# Patient Record
Sex: FEMALE | URBAN - METROPOLITAN AREA
[De-identification: names, ages, dates, MRNs, and addresses within clinical notes are randomized per-mention and may not be internally consistent; named-entity substitution may affect disease eponyms.]

---

## 2022-03-07 ENCOUNTER — APPOINTMENT (RX ONLY)
Dept: URBAN - METROPOLITAN AREA CLINIC 328 | Facility: CLINIC | Age: 57
Setting detail: DERMATOLOGY
End: 2022-03-07

## 2022-03-07 DIAGNOSIS — L82.1 OTHER SEBORRHEIC KERATOSIS: ICD-10-CM

## 2022-03-07 DIAGNOSIS — L40.0 PSORIASIS VULGARIS: ICD-10-CM

## 2022-03-07 DIAGNOSIS — L81.4 OTHER MELANIN HYPERPIGMENTATION: ICD-10-CM

## 2022-03-07 DIAGNOSIS — D22 MELANOCYTIC NEVI: ICD-10-CM

## 2022-03-07 DIAGNOSIS — D18.0 HEMANGIOMA: ICD-10-CM

## 2022-03-07 DIAGNOSIS — D18.0 HEMANGIOMA: ICD-10-CM | Status: INADEQUATELY CONTROLLED

## 2022-03-07 PROBLEM — D18.01 HEMANGIOMA OF SKIN AND SUBCUTANEOUS TISSUE: Status: ACTIVE | Noted: 2022-03-07

## 2022-03-07 PROBLEM — D22.5 MELANOCYTIC NEVI OF TRUNK: Status: ACTIVE | Noted: 2022-03-07

## 2022-03-07 PROCEDURE — ? PRESCRIPTION MEDICATION MANAGEMENT

## 2022-03-07 PROCEDURE — 99203 OFFICE O/P NEW LOW 30 MIN: CPT

## 2022-03-07 PROCEDURE — ? FULL BODY SKIN EXAM

## 2022-03-07 PROCEDURE — ? COUNSELING

## 2022-03-07 PROCEDURE — ? IN-HOUSE DISPENSING PHARMACY

## 2022-03-07 PROCEDURE — ? ADDITIONAL NOTES

## 2022-03-07 PROCEDURE — ? TREATMENT REGIMEN

## 2022-03-07 PROCEDURE — ? PRESCRIPTION

## 2022-03-07 RX ORDER — CALCIPOTRIENE 0.05 MG/ML
SOLUTION TOPICAL
Qty: 60 | Refills: 3 | Status: ERX | COMMUNITY
Start: 2022-03-07

## 2022-03-07 RX ORDER — FLUOCINONIDE 0.5 MG/ML
SOLUTION TOPICAL
Qty: 60 | Refills: 3 | Status: ERX | COMMUNITY
Start: 2022-03-07

## 2022-03-07 RX ADMIN — CALCIPOTRIENE: 0.05 SOLUTION TOPICAL at 00:00

## 2022-03-07 RX ADMIN — FLUOCINONIDE: 0.5 SOLUTION TOPICAL at 00:00

## 2022-03-07 ASSESSMENT — LOCATION ZONE DERM
LOCATION ZONE: ARM
LOCATION ZONE: SCALP
LOCATION ZONE: LEG
LOCATION ZONE: TRUNK
LOCATION ZONE: LEG

## 2022-03-07 ASSESSMENT — LOCATION DETAILED DESCRIPTION DERM
LOCATION DETAILED: RIGHT MEDIAL FRONTAL SCALP
LOCATION DETAILED: EPIGASTRIC SKIN
LOCATION DETAILED: LEFT ANTERIOR PROXIMAL THIGH
LOCATION DETAILED: LEFT ANTERIOR SHOULDER
LOCATION DETAILED: LEFT MID-UPPER BACK
LOCATION DETAILED: RIGHT SUPERIOR MEDIAL MIDBACK
LOCATION DETAILED: RIGHT MEDIAL UPPER BACK
LOCATION DETAILED: LEFT ANTERIOR PROXIMAL THIGH

## 2022-03-07 ASSESSMENT — LOCATION SIMPLE DESCRIPTION DERM
LOCATION SIMPLE: RIGHT SCALP
LOCATION SIMPLE: LEFT SHOULDER
LOCATION SIMPLE: LEFT THIGH
LOCATION SIMPLE: LEFT UPPER BACK
LOCATION SIMPLE: RIGHT LOWER BACK
LOCATION SIMPLE: RIGHT UPPER BACK
LOCATION SIMPLE: ABDOMEN
LOCATION SIMPLE: LEFT THIGH

## 2022-03-07 NOTE — PROCEDURE: PRESCRIPTION MEDICATION MANAGEMENT
Initiate Treatment: Per DB to start LB on scalp use fluocinonide solution and dovonex solution
Render In Strict Bullet Format?: No
Detail Level: Zone

## 2022-03-07 NOTE — PROCEDURE: ADDITIONAL NOTES
Detail Level: Simple
Render Risk Assessment In Note?: no
Additional Notes: Patient consent was obtained to proceed with the visit and recommended plan of care after discussion of all risks and benefits, including the risks of COVID-19 exposure
Additional Notes: Patient consent was obtained to proceed with the visit and recommended plan of care after discussion of all risks and benefits, including the risks of COVID-19 exposure.

## 2022-03-07 NOTE — PROCEDURE: IN-HOUSE DISPENSING PHARMACY
Product 59 Amount/Unit (Numbers Only): 0
Product 51 Unit Type: mg
Product 1 Unit Type: ml
Product 5 Application Directions: apply to affected area
Name Of Product 5: Rosacea Cream #13
Product 2 Refills: 3
Render Refills If Set To 0: Yes
Name Of Product 4: Acne # 4
Name Of Product 1: Dermatitis Topical  Solution #16
Product 3 Refills: 4
Product 4 Application Directions: apply to affected area at night
Detail Level: Zone
Product 1 Application Directions: Mix into a jar of Cerave Cream and apply to affected areas daily.

## 2022-04-14 ENCOUNTER — OFFICE VISIT (OUTPATIENT)
Dept: URBAN - METROPOLITAN AREA CLINIC 60 | Facility: CLINIC | Age: 57
End: 2022-04-14

## 2022-05-02 ENCOUNTER — LAB OUTSIDE AN ENCOUNTER (OUTPATIENT)
Dept: URBAN - METROPOLITAN AREA CLINIC 121 | Facility: CLINIC | Age: 57
End: 2022-05-02

## 2022-05-02 ENCOUNTER — APPOINTMENT (RX ONLY)
Dept: URBAN - METROPOLITAN AREA CLINIC 328 | Facility: CLINIC | Age: 57
Setting detail: DERMATOLOGY
End: 2022-05-02

## 2022-05-02 DIAGNOSIS — L40.0 PSORIASIS VULGARIS: ICD-10-CM | Status: WELL CONTROLLED

## 2022-05-02 PROCEDURE — ? COUNSELING

## 2022-05-02 PROCEDURE — ? PRESCRIPTION MEDICATION MANAGEMENT

## 2022-05-02 PROCEDURE — 99213 OFFICE O/P EST LOW 20 MIN: CPT

## 2022-05-02 ASSESSMENT — LOCATION DETAILED DESCRIPTION DERM: LOCATION DETAILED: RIGHT MEDIAL FRONTAL SCALP

## 2022-05-02 ASSESSMENT — LOCATION SIMPLE DESCRIPTION DERM: LOCATION SIMPLE: RIGHT SCALP

## 2022-05-02 ASSESSMENT — LOCATION ZONE DERM: LOCATION ZONE: SCALP

## 2022-05-02 NOTE — PROCEDURE: PRESCRIPTION MEDICATION MANAGEMENT
Render In Strict Bullet Format?: No
Continue Regimen: Calcipotriene 0.005% every day to every other day. Fluocinonide when flared.
Detail Level: Zone

## 2022-05-06 LAB
A/G RATIO: 1.8
ACTIN (SMOOTH MUSCLE) ANTIBODY: (no result)
AFP, SERUM, TUMOR MARKER: (no result)
ALBUMIN: (no result)
ALKALINE PHOSPHATASE: (no result)
ALPHA-1-ANTITRYPSIN, SERUM: (no result)
ALT (SGPT): (no result)
ANA DIRECT: NEGATIVE
AST (SGOT): (no result)
BASO (ABSOLUTE): (no result)
BASOS: (no result)
BILIRUBIN, TOTAL: (no result)
BUN/CREATININE RATIO: 13
BUN: (no result)
CALCIUM: (no result)
CARBON DIOXIDE, TOTAL: (no result)
CERULOPLASMIN: (no result)
CHLORIDE: (no result)
COPPER, SERUM OR PLASMA: (no result)
CREATININE: (no result)
EGFR: (no result)
EOS (ABSOLUTE): (no result)
EOS: (no result)
FERRITIN: (no result)
GLOBULIN, TOTAL: (no result)
GLUCOSE: (no result)
HEMATOCRIT: (no result)
HEMATOLOGY COMMENTS:: (no result)
HEMOGLOBIN: (no result)
HEP A AB, TOTAL: NEGATIVE
HEP B CORE AB, IGM: NEGATIVE
HEP B CORE AB, TOT: NEGATIVE
HEP B SURFACE AB, QUAL: NON REACTIVE
HEREDITARY  HEMOCHROMATOSIS: (no result)
IMMATURE CELLS: (no result)
IMMATURE GRANS (ABS): (no result)
IMMATURE GRANULOCYTES: (no result)
INR: 1
IRON BIND.CAP.(TIBC): (no result)
IRON SATURATION: (no result)
IRON: (no result)
LYMPHS (ABSOLUTE): (no result)
LYMPHS: (no result)
MCH: (no result)
MCHC: (no result)
MCV: (no result)
MITOCHONDRIAL (M2) ANTIBODY: (no result)
MONOCYTES(ABSOLUTE): (no result)
MONOCYTES: (no result)
NEUTROPHILS (ABSOLUTE): (no result)
NEUTROPHILS: (no result)
NRBC: (no result)
PLATELETS: (no result)
POTASSIUM: (no result)
PROTEIN, TOTAL: (no result)
PROTHROMBIN TIME: (no result)
RBC: (no result)
RDW: (no result)
SODIUM: (no result)
UIBC: (no result)
WBC: (no result)
WRITTEN AUTHORIZATION: (no result)

## 2022-05-16 ENCOUNTER — OFFICE VISIT (OUTPATIENT)
Dept: URBAN - METROPOLITAN AREA CLINIC 60 | Facility: CLINIC | Age: 57
End: 2022-05-16

## 2022-07-09 ENCOUNTER — TELEPHONE ENCOUNTER (OUTPATIENT)
Dept: URBAN - METROPOLITAN AREA CLINIC 121 | Facility: CLINIC | Age: 57
End: 2022-07-09

## 2022-07-09 RX ORDER — LOSARTAN POTASSIUM 100 MG/1
TABLET, FILM COATED ORAL
Refills: 0 | OUTPATIENT
Start: 2022-04-19 | End: 2022-05-16

## 2022-07-09 RX ORDER — METOPROLOL SUCCINATE 25 MG/1
TABLET, EXTENDED RELEASE ORAL
Refills: 0 | OUTPATIENT
Start: 2022-04-17 | End: 2022-05-16

## 2022-07-09 RX ORDER — BENZONATATE 100 MG/1
CAPSULE ORAL
Refills: 0 | OUTPATIENT
Start: 2022-03-21 | End: 2022-04-14

## 2022-07-09 RX ORDER — CALCIPOTRIENE 0.05 MG/ML
SOLUTION TOPICAL
Refills: 0 | OUTPATIENT
Start: 2022-03-08 | End: 2022-04-14

## 2022-07-09 RX ORDER — LOSARTAN POTASSIUM 100 MG/1
TABLET, FILM COATED ORAL
Refills: 0 | OUTPATIENT
Start: 2022-03-22 | End: 2022-04-14

## 2022-07-09 RX ORDER — METOPROLOL SUCCINATE 25 MG/1
TABLET, EXTENDED RELEASE ORAL
Refills: 0 | OUTPATIENT
Start: 2022-03-16 | End: 2022-04-14

## 2022-07-10 ENCOUNTER — TELEPHONE ENCOUNTER (OUTPATIENT)
Dept: URBAN - METROPOLITAN AREA CLINIC 121 | Facility: CLINIC | Age: 57
End: 2022-07-10

## 2022-07-10 RX ORDER — METOPROLOL SUCCINATE 25 MG/1
TABLET, EXTENDED RELEASE ORAL
Refills: 0 | Status: ACTIVE | COMMUNITY
Start: 2022-04-14

## 2022-07-10 RX ORDER — LOSARTAN POTASSIUM 100 MG/1
TABLET, FILM COATED ORAL
Refills: 0 | Status: ACTIVE | COMMUNITY
Start: 2022-05-16

## 2022-07-10 RX ORDER — METOPROLOL SUCCINATE 25 MG/1
TABLET, EXTENDED RELEASE ORAL
Refills: 0 | Status: ACTIVE | COMMUNITY
Start: 2022-05-16

## 2022-07-10 RX ORDER — LOSARTAN POTASSIUM 100 MG/1
TABLET, FILM COATED ORAL
Refills: 0 | Status: ACTIVE | COMMUNITY
Start: 2022-04-14

## 2022-07-30 ENCOUNTER — TELEPHONE ENCOUNTER (OUTPATIENT)
Age: 57
End: 2022-07-30

## 2022-07-31 ENCOUNTER — TELEPHONE ENCOUNTER (OUTPATIENT)
Age: 57
End: 2022-07-31

## 2022-08-01 ENCOUNTER — LAB OUTSIDE AN ENCOUNTER (OUTPATIENT)
Dept: URBAN - METROPOLITAN AREA CLINIC 63 | Facility: CLINIC | Age: 57
End: 2022-08-01

## 2022-08-02 LAB
A/G RATIO: 1.7
ALBUMIN: 4.5
ALKALINE PHOSPHATASE: 114
ALT (SGPT): 43
AST (SGOT): 28
BASO (ABSOLUTE): 0
BASOS: 0
BILIRUBIN, TOTAL: 0.3
BUN/CREATININE RATIO: 20
BUN: 14
CALCIUM: 9.7
CARBON DIOXIDE, TOTAL: 24
CHLORIDE: 103
CREATININE: 0.69
EGFR: 101
EOS (ABSOLUTE): 0.1
EOS: 2
FERRITIN, SERUM: 232
GLOBULIN, TOTAL: 2.6
GLUCOSE: 92
HEMATOCRIT: 41.6
HEMATOLOGY COMMENTS:: (no result)
HEMOGLOBIN: 14.3
IMMATURE CELLS: (no result)
IMMATURE GRANS (ABS): 0
IMMATURE GRANULOCYTES: 0
LYMPHS (ABSOLUTE): 1.7
LYMPHS: 25
MCH: 33.7
MCHC: 34.4
MCV: 98
MONOCYTES(ABSOLUTE): 0.6
MONOCYTES: 8
NEUTROPHILS (ABSOLUTE): 4.5
NEUTROPHILS: 65
NRBC: (no result)
PLATELETS: 257
POTASSIUM: 4.5
PROTEIN, TOTAL: 7.1
RBC: 4.24
RDW: 13.1
SODIUM: 140
WBC: 6.9

## 2022-08-15 ENCOUNTER — WEB ENCOUNTER (OUTPATIENT)
Dept: URBAN - METROPOLITAN AREA CLINIC 60 | Facility: CLINIC | Age: 57
End: 2022-08-15

## 2022-08-15 ENCOUNTER — OFFICE VISIT (OUTPATIENT)
Dept: URBAN - METROPOLITAN AREA CLINIC 60 | Facility: CLINIC | Age: 57
End: 2022-08-15
Payer: COMMERCIAL

## 2022-08-15 VITALS
HEART RATE: 75 BPM | RESPIRATION RATE: 12 BRPM | DIASTOLIC BLOOD PRESSURE: 82 MMHG | OXYGEN SATURATION: 96 % | BODY MASS INDEX: 29.32 KG/M2 | TEMPERATURE: 97 F | HEIGHT: 67 IN | WEIGHT: 186.8 LBS | SYSTOLIC BLOOD PRESSURE: 136 MMHG

## 2022-08-15 DIAGNOSIS — R79.89 ABNORMAL LFTS: ICD-10-CM

## 2022-08-15 PROCEDURE — 99213 OFFICE O/P EST LOW 20 MIN: CPT | Performed by: NURSE PRACTITIONER

## 2022-08-15 RX ORDER — LOSARTAN POTASSIUM 100 MG/1
TABLET, FILM COATED ORAL
Refills: 0 | Status: ACTIVE | COMMUNITY
Start: 2022-05-16

## 2022-08-15 RX ORDER — METOPROLOL SUCCINATE 25 MG/1
TABLET, EXTENDED RELEASE ORAL
Refills: 0 | Status: ACTIVE | COMMUNITY
Start: 2022-05-16

## 2022-08-15 NOTE — HPI-PREVIOUS IMAGING
Ultrasound abdomen Impression: 1.  Coarsened hepatic echotexture indicative of diffuse hepatocellular disease such as fatty infiltration. 2.  Nonvisualization of the pancreas, obscured by bowel gas.  FibroScan 08/01/2022 Impression: Mild steatosis without evidence of fibrosis, S1, F0  FibroScan 02/08/2022 Impression: 1.  Severe steatosis S3 2.  No fibrosis F0  Ultrasound liver 01/25/2022 Impression: Increased hepatic echogenicity suggestive of hepatocellular disease or steatosis.

## 2022-08-15 NOTE — HPI-PREVIOUS LABS
Laboratory results 08/01/2022 as reported later in note.  Laboratory results 08/01/2022 CBC MCV 98 CMP ALT 43 Ferritin 232  Laboratory results 05/02/2022 CBC unremarkable CMP Glucose 102 ALT 39 Ferritin 259 Alpha 1 antitrypsin 96 AFP within normal limits Smooth muscle antibody negative SHANNON direct negative Ceruloplasmin within normal limits Copper, serum within normal limits Hepatitis A and hepatitis B negative Hereditary hemochromatosis mutation not detected Iron and TIBC within normal limits Mitochondrial antibody negative PT/INR 10.2/1.0  Laboratory results 03/25/2022 Hepatic function panel Alkaline phosphatase 129 AST 65   Laboratory results 03/21/2022 CBC HCT 48.5 CMP Glucose 115 Protein, total 8.5 Alk phos 131   Influenza A positive  Laboratory results 01/18/2022 HCV antibody negative

## 2022-11-14 ENCOUNTER — OFFICE VISIT (OUTPATIENT)
Dept: URBAN - METROPOLITAN AREA CLINIC 60 | Facility: CLINIC | Age: 57
End: 2022-11-14

## 2022-12-01 ENCOUNTER — LAB OUTSIDE AN ENCOUNTER (OUTPATIENT)
Dept: URBAN - METROPOLITAN AREA CLINIC 60 | Facility: CLINIC | Age: 57
End: 2022-12-01

## 2022-12-09 LAB
A/G RATIO: 1.8
ALBUMIN: 4.6
ALKALINE PHOSPHATASE: 121
ALT (SGPT): 31
AST (SGOT): 33
BASO (ABSOLUTE): 0
BASOS: 0
BILIRUBIN, TOTAL: 0.3
BUN/CREATININE RATIO: 20
BUN: 12
CALCIUM: 9.6
CARBON DIOXIDE, TOTAL: 24
CHLORIDE: 104
CREATININE: 0.6
EGFR: 104
EOS (ABSOLUTE): 0.2
EOS: 2
FERRITIN, SERUM: 288
GLOBULIN, TOTAL: 2.6
GLUCOSE: 106
HEMATOCRIT: 39.2
HEMATOLOGY COMMENTS:: (no result)
HEMOGLOBIN: 12.8
IMMATURE CELLS: (no result)
IMMATURE GRANS (ABS): 0
IMMATURE GRANULOCYTES: 0
IRON BIND.CAP.(TIBC): 366
IRON SATURATION: 18
IRON: 65
LYMPHS (ABSOLUTE): 1.3
LYMPHS: 17
MCH: 31.8
MCHC: 32.7
MCV: 97
MONOCYTES(ABSOLUTE): 0.5
MONOCYTES: 6
NEUTROPHILS (ABSOLUTE): 5.7
NEUTROPHILS: 75
NRBC: (no result)
PLATELETS: 258
POTASSIUM: 4.7
PROTEIN, TOTAL: 7.2
RBC: 4.03
RDW: 12.9
SODIUM: 140
UIBC: 301
WBC: 7.6

## 2022-12-12 ENCOUNTER — OFFICE VISIT (OUTPATIENT)
Dept: URBAN - METROPOLITAN AREA CLINIC 60 | Facility: CLINIC | Age: 57
End: 2022-12-12
Payer: COMMERCIAL

## 2022-12-12 VITALS
RESPIRATION RATE: 20 BRPM | DIASTOLIC BLOOD PRESSURE: 80 MMHG | SYSTOLIC BLOOD PRESSURE: 120 MMHG | TEMPERATURE: 98.2 F | WEIGHT: 198 LBS | HEIGHT: 67 IN | BODY MASS INDEX: 31.08 KG/M2 | HEART RATE: 88 BPM | OXYGEN SATURATION: 98 %

## 2022-12-12 DIAGNOSIS — R79.89 ABNORMAL LFTS: ICD-10-CM

## 2022-12-12 DIAGNOSIS — K76.0 FATTY LIVER: ICD-10-CM

## 2022-12-12 PROBLEM — 197321007: Status: ACTIVE | Noted: 2022-12-12

## 2022-12-12 PROCEDURE — 99213 OFFICE O/P EST LOW 20 MIN: CPT | Performed by: NURSE PRACTITIONER

## 2022-12-12 RX ORDER — METOPROLOL SUCCINATE 25 MG/1
TABLET, EXTENDED RELEASE ORAL
Refills: 0 | Status: ACTIVE | COMMUNITY
Start: 2022-05-16

## 2022-12-12 RX ORDER — LOSARTAN POTASSIUM 100 MG/1
TABLET, FILM COATED ORAL
Refills: 0 | Status: ACTIVE | COMMUNITY
Start: 2022-05-16

## 2022-12-12 NOTE — HPI-PREVIOUS IMAGING
Ultrasound abdomen 08/01/2022 Impression: 1.  Coarsened hepatic echotexture indicative of diffuse hepatocellular disease such as fatty infiltration. 2.  Nonvisualization of the pancreas, obscured by bowel gas.  FibroScan 08/01/2022 Impression: Mild steatosis without evidence of fibrosis, S1, F0  FibroScan 02/08/2022 Impression: 1.  Severe steatosis S3 2.  No fibrosis F0  Ultrasound liver 01/25/2022 Impression: Increased hepatic echogenicity suggestive of hepatocellular disease or steatosis.

## 2022-12-12 NOTE — HPI-PREVIOUS LABS
Laboratory results 12/08/2022 CBC unremarkable CMP Glucose 106 Ferritin 288 Iron indices within normal limits  Laboratory results 08/01/2022 CBC MCV 98 CMP ALT 43 Ferritin 232  Laboratory results 05/02/2022 CBC unremarkable CMP Glucose 102 ALT 39 Ferritin 259 Alpha 1 antitrypsin 96 AFP within normal limits Smooth muscle antibody negative SHANNON direct negative Ceruloplasmin within normal limits Copper, serum within normal limits Hepatitis A and hepatitis B negative Hereditary hemochromatosis mutation not detected Iron and TIBC within normal limits Mitochondrial antibody negative PT/INR 10.2/1.0  Laboratory results 03/25/2022 Hepatic function panel Alkaline phosphatase 129 AST 65   Laboratory results 03/21/2022 CBC HCT 48.5 CMP Glucose 115 Protein, total 8.5 Alk phos 131   Influenza A positive  Laboratory results 01/18/2022 HCV antibody negative

## 2022-12-12 NOTE — HPI-TODAY'S VISIT:
Ms. Oconnor is a pleasant 57-year-old female evaluated in follow-up of LFTs.  She was previously evaluated 08/15/2022 in follow-up of elevated liver enzymes, and fatty liver. She was asymptomatic and without signs or symptoms of decompensated liver disease.  Drinks 2 to 4 glasses of wine about 2-3 times weekly.  Denied use of supplements, taking only losartan and metoprolol.  Having daily bowel movements. Previous work-up negative for viral and autoimmune etiology. She was advised to discontinue alcohol, repeat labs in 3 months, follow-up afterwards.  Repeat labs and imaging in 6 months, follow-up afterwards.  History of fatty liver.  LFTs were normalizing, ferritin slightly elevated, negative for hereditary hemochromatosis mutation.  .  Today, she is asymptomatic and voices no complaint.   Avoided alcohol at time of recent labs.

## 2023-03-01 ENCOUNTER — LAB OUTSIDE AN ENCOUNTER (OUTPATIENT)
Dept: URBAN - METROPOLITAN AREA CLINIC 60 | Facility: CLINIC | Age: 58
End: 2023-03-01

## 2023-03-23 ENCOUNTER — OFFICE VISIT (OUTPATIENT)
Dept: URBAN - METROPOLITAN AREA CLINIC 60 | Facility: CLINIC | Age: 58
End: 2023-03-23

## 2023-03-30 ENCOUNTER — TELEPHONE ENCOUNTER (OUTPATIENT)
Dept: URBAN - METROPOLITAN AREA CLINIC 64 | Facility: CLINIC | Age: 58
End: 2023-03-30

## 2023-04-13 ENCOUNTER — OFFICE VISIT (OUTPATIENT)
Dept: URBAN - METROPOLITAN AREA CLINIC 60 | Facility: CLINIC | Age: 58
End: 2023-04-13

## 2023-06-01 ENCOUNTER — OFFICE VISIT (OUTPATIENT)
Dept: URBAN - METROPOLITAN AREA CLINIC 60 | Facility: CLINIC | Age: 58
End: 2023-06-01

## 2023-06-15 ENCOUNTER — LAB OUTSIDE AN ENCOUNTER (OUTPATIENT)
Dept: URBAN - METROPOLITAN AREA CLINIC 63 | Facility: CLINIC | Age: 58
End: 2023-06-15

## 2023-06-16 LAB
A/G RATIO: 1.8
ALBUMIN: 4.8
ALKALINE PHOSPHATASE: 121
ALT (SGPT): 23
AST (SGOT): 25
BASO (ABSOLUTE): 0
BASOS: 0
BILIRUBIN, TOTAL: 0.3
BUN/CREATININE RATIO: 21
BUN: 14
CALCIUM: 9.8
CARBON DIOXIDE, TOTAL: 23
CHLORIDE: 102
CREATININE: 0.68
EGFR: 101
EOS (ABSOLUTE): 0.2
EOS: 3
FERRITIN, SERUM: 185
GLOBULIN, TOTAL: 2.6
GLUCOSE: 100
HEMATOCRIT: 40
HEMATOLOGY COMMENTS:: (no result)
HEMOGLOBIN: 13.2
IMMATURE CELLS: (no result)
IMMATURE GRANS (ABS): 0
IMMATURE GRANULOCYTES: 0
IRON BIND.CAP.(TIBC): 338
IRON SATURATION: 21
IRON: 72
LYMPHS (ABSOLUTE): 1.8
LYMPHS: 22
MCH: 30.8
MCHC: 33
MCV: 93
MONOCYTES(ABSOLUTE): 0.6
MONOCYTES: 7
NEUTROPHILS (ABSOLUTE): 5.6
NEUTROPHILS: 68
NRBC: (no result)
PLATELETS: 261
POTASSIUM: 5
PROTEIN, TOTAL: 7.4
RBC: 4.29
RDW: 14
SODIUM: 142
UIBC: 266
WBC: 8.2

## 2023-10-04 ENCOUNTER — TELEPHONE ENCOUNTER (OUTPATIENT)
Dept: URBAN - METROPOLITAN AREA CLINIC 9 | Facility: CLINIC | Age: 58
End: 2023-10-04

## 2023-10-04 VITALS — HEIGHT: 67 IN | BODY MASS INDEX: 32.96 KG/M2 | WEIGHT: 210 LBS

## 2023-10-04 RX ORDER — METOPROLOL SUCCINATE 25 MG/1
TABLET, EXTENDED RELEASE ORAL
Refills: 0 | COMMUNITY
Start: 2022-05-16

## 2023-10-04 RX ORDER — LOSARTAN POTASSIUM 100 MG/1
TABLET, FILM COATED ORAL
Refills: 0 | COMMUNITY
Start: 2022-05-16

## 2023-11-15 ENCOUNTER — DASHBOARD ENCOUNTERS (OUTPATIENT)
Age: 58
End: 2023-11-15

## 2023-11-15 ENCOUNTER — OFFICE VISIT (OUTPATIENT)
Dept: URBAN - METROPOLITAN AREA CLINIC 9 | Facility: CLINIC | Age: 58
End: 2023-11-15
Payer: COMMERCIAL

## 2023-11-15 ENCOUNTER — OFFICE VISIT (OUTPATIENT)
Dept: URBAN - METROPOLITAN AREA CLINIC 9 | Facility: CLINIC | Age: 58
End: 2023-11-15

## 2023-11-15 VITALS
DIASTOLIC BLOOD PRESSURE: 72 MMHG | WEIGHT: 212 LBS | SYSTOLIC BLOOD PRESSURE: 128 MMHG | HEIGHT: 67 IN | BODY MASS INDEX: 33.27 KG/M2

## 2023-11-15 DIAGNOSIS — Z86.010 HISTORY OF COLON POLYPS: ICD-10-CM

## 2023-11-15 PROBLEM — 428283002: Status: ACTIVE | Noted: 2023-11-15

## 2023-11-15 PROCEDURE — 99202 OFFICE O/P NEW SF 15 MIN: CPT | Performed by: INTERNAL MEDICINE

## 2023-11-15 RX ORDER — LOSARTAN POTASSIUM 100 MG/1
TABLET, FILM COATED ORAL
Refills: 0 | COMMUNITY
Start: 2022-05-16

## 2023-11-15 RX ORDER — LOSARTAN POTASSIUM 100 MG/1
TABLET, FILM COATED ORAL
Refills: 0 | Status: ACTIVE | COMMUNITY
Start: 2022-05-16

## 2023-11-15 RX ORDER — METOPROLOL SUCCINATE 25 MG/1
TABLET, EXTENDED RELEASE ORAL
Refills: 0 | COMMUNITY
Start: 2022-05-16

## 2023-11-15 RX ORDER — METOPROLOL SUCCINATE 25 MG/1
TABLET, EXTENDED RELEASE ORAL
Refills: 0 | Status: ACTIVE | COMMUNITY
Start: 2022-05-16

## 2023-11-15 NOTE — HPI-TODAY'S VISIT:
pt here for eval for a colonoscopy. . Pt has a hx/o colon polyps . Hx/o dilated cardiomyopathy with complete resolution and now normal EF and cardiac function . No bowel changes, bleeding, dysphagia, gerd.  . She needs her f/u colon. Will arrange. RTC prn.  .

## 2023-11-30 ENCOUNTER — CLAIMS CREATED FROM THE CLAIM WINDOW (OUTPATIENT)
Dept: URBAN - METROPOLITAN AREA SURGERY CENTER 9 | Facility: SURGERY CENTER | Age: 58
End: 2023-11-30
Payer: COMMERCIAL

## 2023-11-30 DIAGNOSIS — K64.1 SECOND DEGREE HEMORRHOIDS: ICD-10-CM

## 2023-11-30 DIAGNOSIS — K57.30 DIVERTICULOSIS OF LARGE INTESTINE WITHOUT PERFORATION OR ABSCESS WITHOUT BLEEDING: ICD-10-CM

## 2023-11-30 DIAGNOSIS — Z86.010 HISTORY OF ADENOMATOUS POLYP OF COLON: ICD-10-CM

## 2023-11-30 DIAGNOSIS — Z86.010 PERSONAL HISTORY OF COLONIC POLYPS: ICD-10-CM

## 2023-11-30 PROCEDURE — 45378 DIAGNOSTIC COLONOSCOPY: CPT | Performed by: INTERNAL MEDICINE

## 2023-11-30 PROCEDURE — 00812 ANES LWR INTST SCR COLSC: CPT | Performed by: NURSE ANESTHETIST, CERTIFIED REGISTERED

## 2023-11-30 RX ORDER — LOSARTAN POTASSIUM 100 MG/1
TABLET, FILM COATED ORAL
Refills: 0 | COMMUNITY
Start: 2022-05-16

## 2023-11-30 RX ORDER — METOPROLOL SUCCINATE 25 MG/1
TABLET, EXTENDED RELEASE ORAL
Refills: 0 | COMMUNITY
Start: 2022-05-16

## 2024-07-18 ENCOUNTER — APPOINTMENT (RX ONLY)
Dept: URBAN - METROPOLITAN AREA CLINIC 334 | Facility: CLINIC | Age: 59
Setting detail: DERMATOLOGY
End: 2024-07-18

## 2024-07-18 DIAGNOSIS — L81.4 OTHER MELANIN HYPERPIGMENTATION: ICD-10-CM

## 2024-07-18 DIAGNOSIS — Z12.83 ENCOUNTER FOR SCREENING FOR MALIGNANT NEOPLASM OF SKIN: ICD-10-CM

## 2024-07-18 DIAGNOSIS — L40.0 PSORIASIS VULGARIS: ICD-10-CM | Status: WELL CONTROLLED

## 2024-07-18 DIAGNOSIS — D18.0 HEMANGIOMA: ICD-10-CM

## 2024-07-18 DIAGNOSIS — L82.1 OTHER SEBORRHEIC KERATOSIS: ICD-10-CM

## 2024-07-18 PROBLEM — D18.01 HEMANGIOMA OF SKIN AND SUBCUTANEOUS TISSUE: Status: ACTIVE | Noted: 2024-07-18

## 2024-07-18 PROCEDURE — ? ADDITIONAL NOTES

## 2024-07-18 PROCEDURE — 99213 OFFICE O/P EST LOW 20 MIN: CPT

## 2024-07-18 PROCEDURE — ? FULL BODY SKIN EXAM

## 2024-07-18 PROCEDURE — ? COUNSELING

## 2024-07-18 PROCEDURE — ? PRESCRIPTION MEDICATION MANAGEMENT

## 2024-07-18 PROCEDURE — ? TREATMENT REGIMEN

## 2024-07-18 ASSESSMENT — LOCATION SIMPLE DESCRIPTION DERM
LOCATION SIMPLE: CHEST
LOCATION SIMPLE: ABDOMEN
LOCATION SIMPLE: RIGHT UPPER BACK
LOCATION SIMPLE: RIGHT SCALP

## 2024-07-18 ASSESSMENT — LOCATION DETAILED DESCRIPTION DERM
LOCATION DETAILED: UPPER STERNUM
LOCATION DETAILED: RIGHT INFERIOR MEDIAL UPPER BACK
LOCATION DETAILED: RIGHT MEDIAL FRONTAL SCALP
LOCATION DETAILED: PERIUMBILICAL SKIN

## 2024-07-18 ASSESSMENT — LOCATION ZONE DERM
LOCATION ZONE: SCALP
LOCATION ZONE: TRUNK

## 2024-07-18 NOTE — PROCEDURE: ADDITIONAL NOTES
Detail Level: Simple
Render Risk Assessment In Note?: no
Additional Notes: Patient controls psoriasis well with shampoos. Does not need medication at this time.\\nSamples of Zoryve given to pt to try. She will call us if she wants us to send in the medication.

## 2024-10-03 ENCOUNTER — OFFICE VISIT (OUTPATIENT)
Dept: URBAN - METROPOLITAN AREA CLINIC 9 | Facility: CLINIC | Age: 59
End: 2024-10-03

## 2024-12-26 ENCOUNTER — OFFICE VISIT (OUTPATIENT)
Dept: URBAN - METROPOLITAN AREA CLINIC 9 | Facility: CLINIC | Age: 59
End: 2024-12-26

## 2025-02-26 NOTE — HPI-TODAY'S VISIT:
Ms. Oconnor is a pleasant 56-year-old female evaluated in follow-up of abnormal lab results, and imaging.  She was previously evaluated 05/16/2022 in follow-up of same, history of fatty liver.  LFTs were normalizing, ferritin slightly elevated, negative for hereditary hemochromatosis mutation.  She was advised to repeat labs, avoid iron supplement, ultrasound and FibroScan in 3 months and follow-up afterwards.  Today, she is asymptomatic denies pruritus, jaundice.  Drinks 2 to 4 glasses of wine about 2-3 times weekly.  Denies use of supplements, taking only losartan and metoprolol.  Having daily bowel movements.  She denied previous history of elevated LFTs. Discontinued alcohol in January 2022, previously consuming alcohol on social basis.  Denied use of supplements, or antibiotics. Intentional weight loss of 35 pounds in 2022.   May have been drinking alcohol during time of labs in December 2021 and January 2022. Records indicate LFTs became elevated during acute illness January 2022, and have been trending down.  Negative for viral and autoimmune etiology.  History of RUQ point tenderness discomfort with palpation of the lowest rib, she was advised this is more C/W costochondritis.  She was evaluated in [facility] ED 03/21/2022 with shortness of breath, congestion, body aches, cough and headache x2 weeks history of dilated cardiomyopathy, found to be positive for influenza A.  CTA with evidence of pneumonia.  She was already taking Medrol Dosepak, advised to continue.  History of Covid-19 in August 2021.  History of colonoscopy 2018, polyps removed, unsure of recall interval.
yes...

## 2025-02-27 ENCOUNTER — OFFICE VISIT (OUTPATIENT)
Dept: URBAN - METROPOLITAN AREA CLINIC 9 | Facility: CLINIC | Age: 60
End: 2025-02-27